# Patient Record
Sex: FEMALE | Race: WHITE | NOT HISPANIC OR LATINO | URBAN - METROPOLITAN AREA
[De-identification: names, ages, dates, MRNs, and addresses within clinical notes are randomized per-mention and may not be internally consistent; named-entity substitution may affect disease eponyms.]

---

## 2024-07-11 ENCOUNTER — OFFICE VISIT (OUTPATIENT)
Dept: URBAN - METROPOLITAN AREA CLINIC 50 | Facility: CLINIC | Age: 89
End: 2024-07-11
Payer: MEDICARE

## 2024-07-11 VITALS
TEMPERATURE: 98.1 F | WEIGHT: 94 LBS | BODY MASS INDEX: 18.46 KG/M2 | HEIGHT: 60 IN | HEART RATE: 68 BPM | SYSTOLIC BLOOD PRESSURE: 146 MMHG | DIASTOLIC BLOOD PRESSURE: 81 MMHG

## 2024-07-11 DIAGNOSIS — R10.84 ABDOMINAL CRAMPING, GENERALIZED: ICD-10-CM

## 2024-07-11 DIAGNOSIS — R19.5 LOOSE STOOLS: ICD-10-CM

## 2024-07-11 DIAGNOSIS — R10.13 ABDOMINAL DISCOMFORT, EPIGASTRIC: ICD-10-CM

## 2024-07-11 PROBLEM — 271864008: Status: ACTIVE | Noted: 2024-07-11

## 2024-07-11 PROBLEM — 43364001: Status: ACTIVE | Noted: 2024-07-11

## 2024-07-11 PROBLEM — 88111009: Status: ACTIVE | Noted: 2024-07-11

## 2024-07-11 PROBLEM — 398032003: Status: ACTIVE | Noted: 2024-07-11

## 2024-07-11 PROBLEM — 162031009: Status: ACTIVE | Noted: 2024-07-11

## 2024-07-11 PROCEDURE — 99204 OFFICE O/P NEW MOD 45 MIN: CPT | Performed by: PHYSICIAN ASSISTANT

## 2024-07-11 PROCEDURE — 99244 OFF/OP CNSLTJ NEW/EST MOD 40: CPT | Performed by: PHYSICIAN ASSISTANT

## 2024-07-11 RX ORDER — PANTOPRAZOLE SODIUM 20 MG/1
1 TABLET TABLET, DELAYED RELEASE ORAL ONCE A DAY
Qty: 30 TABLET | Refills: 0 | OUTPATIENT
Start: 2024-07-11

## 2024-07-11 RX ORDER — DICYCLOMINE HYDROCHLORIDE 10 MG/1
1 CAPSULES CAPSULE ORAL THREE TIMES A DAY
Qty: 15 CAPSULE | Refills: 0 | OUTPATIENT
Start: 2024-07-11 | End: 2024-07-16

## 2024-07-11 NOTE — HPI-TODAY'S VISIT:
Patient is 89 y/o F of Dr. Lona Clement MD here for discussion on abd discomfort Spouse had seen Dr. Abbasi in past for esophageal CA  Was getting occ loose stools since beginning of yr, but now often now in past 3 months  Getting bloating, gassiness, feels like gut is angry Comes and goes, occ mucus/oily in stool Some occ indigestion, but really bowels are problematic In past constipation, but now worse w loose stools No abd pains, occ discomfort/pressure in lower abdomen Wonders if stress/anxiety worsens symptoms - spouse w memory loss/poor mobility and is cargiver for him BMs 1-4x/day depending on how bad symptoms are, feels better after BM No blood/melena  1-2lb weight loss - attributes to avoiding eating as wonders if worsening symptoms Last labs Tuesday reviewed on [facility] portal w CBC, CMP unremarkable Never been tested for celiac - wonders if this is cause

## 2024-07-12 ENCOUNTER — LAB OUTSIDE AN ENCOUNTER (OUTPATIENT)
Dept: URBAN - METROPOLITAN AREA CLINIC 50 | Facility: CLINIC | Age: 89
End: 2024-07-12

## 2024-07-16 LAB
IMMUNOGLOBULIN A: 159
INTERPRETATION: (no result)
TISSUE TRANSGLUTAMINASE AB, IGA: <1

## 2024-07-17 ENCOUNTER — TELEPHONE ENCOUNTER (OUTPATIENT)
Dept: URBAN - METROPOLITAN AREA CLINIC 50 | Facility: CLINIC | Age: 89
End: 2024-07-17

## 2024-07-18 ENCOUNTER — TELEPHONE ENCOUNTER (OUTPATIENT)
Dept: URBAN - METROPOLITAN AREA CLINIC 50 | Facility: CLINIC | Age: 89
End: 2024-07-18

## 2024-07-18 ENCOUNTER — LAB OUTSIDE AN ENCOUNTER (OUTPATIENT)
Dept: URBAN - METROPOLITAN AREA CLINIC 50 | Facility: CLINIC | Age: 89
End: 2024-07-18

## 2024-07-18 LAB
ADENOVIRUS F 40/41: NOT DETECTED
C. DIFFICILE TOXIN A/B, STOOL - QDX: NEGATIVE
CALPROTECTIN, STOOL - QDX: (no result)
CAMPYLOBACTER: NOT DETECTED
CLOSTRIDIUM DIFFICILE: NOT DETECTED
ENTAMOEBA HISTOLYTICA: NOT DETECTED
ENTEROAGGREGATIVE E.COLI: NOT DETECTED
ENTEROTOXIGENIC E.COLI: NOT DETECTED
ESCHERICHIA COLI O157: NOT DETECTED
GIARDIA LAMBLIA: NOT DETECTED
H. PYLORI (EIA) - QDX: NEGATIVE
NOROVIRUS GI/GII: NOT DETECTED
OVA AND PARASITE - QDX: (no result)
PANCREATICELASTASE ELISA, STOOL: (no result)
ROTAVIRUS A: NOT DETECTED
SALMONELLA SPP.: NOT DETECTED
SHIGA-LIKE TOXIN PRODUCING E.COLI: NOT DETECTED
SHIGELLA SPP. / ENTEROINVASIVE E.COLI: NOT DETECTED
VIBRIO PARAHAEMOLYTICUS: NOT DETECTED
VIBRIO SPP.: NOT DETECTED
YERSINIA ENTEROCOLITICA: NOT DETECTED

## 2024-07-18 RX ORDER — PANCRELIPASE 36000; 180000; 114000 [USP'U]/1; [USP'U]/1; [USP'U]/1
2 TABLETS WITH MEALS, AND 1 TABLET WITH SNACKS CAPSULE, DELAYED RELEASE PELLETS ORAL
Qty: 300 | Refills: 11 | OUTPATIENT
Start: 2024-07-18 | End: 2025-07-13

## 2024-07-21 ENCOUNTER — WEB ENCOUNTER (OUTPATIENT)
Dept: URBAN - METROPOLITAN AREA CLINIC 50 | Facility: CLINIC | Age: 89
End: 2024-07-21

## 2024-07-22 ENCOUNTER — TELEPHONE ENCOUNTER (OUTPATIENT)
Dept: URBAN - METROPOLITAN AREA CLINIC 50 | Facility: CLINIC | Age: 89
End: 2024-07-22

## 2024-07-22 ENCOUNTER — WEB ENCOUNTER (OUTPATIENT)
Dept: URBAN - METROPOLITAN AREA CLINIC 50 | Facility: CLINIC | Age: 89
End: 2024-07-22

## 2024-07-25 ENCOUNTER — OFFICE VISIT (OUTPATIENT)
Dept: URBAN - METROPOLITAN AREA CLINIC 50 | Facility: CLINIC | Age: 89
End: 2024-07-25
Payer: MEDICARE

## 2024-07-25 ENCOUNTER — LAB OUTSIDE AN ENCOUNTER (OUTPATIENT)
Dept: URBAN - METROPOLITAN AREA CLINIC 96 | Facility: CLINIC | Age: 89
End: 2024-07-25

## 2024-07-25 VITALS
SYSTOLIC BLOOD PRESSURE: 171 MMHG | TEMPERATURE: 97.7 F | DIASTOLIC BLOOD PRESSURE: 90 MMHG | WEIGHT: 93 LBS | HEART RATE: 64 BPM | BODY MASS INDEX: 18.26 KG/M2 | HEIGHT: 60 IN

## 2024-07-25 DIAGNOSIS — R19.5 LOOSE STOOLS: ICD-10-CM

## 2024-07-25 DIAGNOSIS — K30 INDIGESTION: ICD-10-CM

## 2024-07-25 DIAGNOSIS — K86.81 EXOCRINE PANCREATIC INSUFFICIENCY: ICD-10-CM

## 2024-07-25 PROBLEM — 48694002: Status: ACTIVE | Noted: 2024-07-25

## 2024-07-25 PROCEDURE — 99213 OFFICE O/P EST LOW 20 MIN: CPT | Performed by: PHYSICIAN ASSISTANT

## 2024-07-25 RX ORDER — PANTOPRAZOLE SODIUM 20 MG/1
1 TABLET TABLET, DELAYED RELEASE ORAL ONCE A DAY
Qty: 30 TABLET | Refills: 0 | Status: ACTIVE | COMMUNITY
Start: 2024-07-11

## 2024-07-25 RX ORDER — PANCRELIPASE 36000; 180000; 114000 [USP'U]/1; [USP'U]/1; [USP'U]/1
2 TABLETS WITH MEALS, AND 1 TABLET WITH SNACKS CAPSULE, DELAYED RELEASE PELLETS ORAL
Qty: 300 | Refills: 11 | Status: ACTIVE | COMMUNITY
Start: 2024-07-18 | End: 2025-07-13

## 2024-07-25 NOTE — HPI-TODAY'S VISIT:
7/25/24: [age] y/o F here for f/u EPI Seen w son, Marco A Admits is eaten up w anxiety around EPI Worried on diet, has done a lot of online reading, read needs to eat 6 times a day Do I need to do so many snacks/meals? Feels has to eat more when looked online and read about it Now on creon, feels has helped a lot, bowels are responding really well Stools seem normal within 1 day, now much less watery/oily, better BMs 1x/day Appetite good, no weight loss, no nausea/vomiting Planning on doing CT later this afternoon -- 7/11/24: Patient is 87 y/o F of Dr. Lona Clement MD here for discussion on abd discomfort Spouse had seen Dr. Abbasi in past for esophageal CA  Was getting occ loose stools since beginning of yr, but now often now in past 3 months  Getting bloating, gassiness, feels like gut is angry Comes and goes, occ mucus/oily in stool Some occ indigestion, but really bowels are problematic In past constipation, but now worse w loose stools No abd pains, occ discomfort/pressure in lower abdomen Wonders if stress/anxiety worsens symptoms - spouse w memory loss/poor mobility and is cargiver for him BMs 1-4x/day depending on how bad symptoms are, feels better after BM No blood/melena  1-2lb weight loss - attributes to avoiding eating as wonders if worsening symptoms Last labs Tuesday reviewed on [facility] portal w CBC, CMP unremarkable Never been tested for celiac - wonders if this is cause

## 2024-07-26 ENCOUNTER — DASHBOARD ENCOUNTERS (OUTPATIENT)
Age: 89
End: 2024-07-26

## 2024-07-26 ENCOUNTER — TELEPHONE ENCOUNTER (OUTPATIENT)
Dept: URBAN - METROPOLITAN AREA CLINIC 50 | Facility: CLINIC | Age: 89
End: 2024-07-26

## 2024-08-06 ENCOUNTER — OFFICE VISIT (OUTPATIENT)
Dept: URBAN - METROPOLITAN AREA CLINIC 50 | Facility: CLINIC | Age: 89
End: 2024-08-06

## 2024-08-08 ENCOUNTER — ERX REFILL RESPONSE (OUTPATIENT)
Dept: URBAN - METROPOLITAN AREA CLINIC 50 | Facility: CLINIC | Age: 89
End: 2024-08-08

## 2024-08-08 RX ORDER — PANTOPRAZOLE SODIUM 20 MG/1
1 TABLET TABLET, DELAYED RELEASE ORAL ONCE A DAY
Qty: 30 TABLET | Refills: 0 | OUTPATIENT

## 2024-08-08 RX ORDER — PANTOPRAZOLE SODIUM 20 MG/1
1 TABLET TABLET, DELAYED RELEASE ORAL ONCE A DAY
Qty: 90 TABLET | Refills: 3 | OUTPATIENT

## 2024-08-14 ENCOUNTER — WEB ENCOUNTER (OUTPATIENT)
Dept: URBAN - METROPOLITAN AREA CLINIC 50 | Facility: CLINIC | Age: 89
End: 2024-08-14

## 2024-08-14 RX ORDER — PANCRELIPASE LIPASE, PANCRELIPASE AMYLASE, AND PANCRELIPASE PROTEASE 149900; 37000; 97300 [USP'U]/1; [USP'U]/1; [USP'U]/1
AS DIRECTED CAPSULE, DELAYED RELEASE ORAL
Qty: 300 | Refills: 11 | OUTPATIENT
Start: 2024-08-15 | End: 2025-08-10

## 2024-08-15 ENCOUNTER — WEB ENCOUNTER (OUTPATIENT)
Dept: URBAN - METROPOLITAN AREA CLINIC 50 | Facility: CLINIC | Age: 89
End: 2024-08-15

## 2024-09-13 ENCOUNTER — WEB ENCOUNTER (OUTPATIENT)
Dept: URBAN - METROPOLITAN AREA CLINIC 50 | Facility: CLINIC | Age: 89
End: 2024-09-13

## 2024-09-26 ENCOUNTER — OFFICE VISIT (OUTPATIENT)
Dept: URBAN - METROPOLITAN AREA CLINIC 50 | Facility: CLINIC | Age: 89
End: 2024-09-26
Payer: MEDICARE

## 2024-09-26 VITALS
SYSTOLIC BLOOD PRESSURE: 165 MMHG | HEIGHT: 60 IN | TEMPERATURE: 98 F | WEIGHT: 93 LBS | BODY MASS INDEX: 18.26 KG/M2 | HEART RATE: 65 BPM | DIASTOLIC BLOOD PRESSURE: 82 MMHG

## 2024-09-26 DIAGNOSIS — K86.81 EXOCRINE PANCREATIC INSUFFICIENCY: ICD-10-CM

## 2024-09-26 DIAGNOSIS — R63.6 UNDERWEIGHT: ICD-10-CM

## 2024-09-26 DIAGNOSIS — K86.89 ATROPHIC PANCREAS: ICD-10-CM

## 2024-09-26 PROCEDURE — 99213 OFFICE O/P EST LOW 20 MIN: CPT | Performed by: INTERNAL MEDICINE

## 2024-09-26 NOTE — HPI-TODAY'S VISIT:
89 yo WF  Seen by HERNANDEZ Rutherford in past Dx w/ EPI - wants to talk to me to get questions answered Dr Douglas can't answer  Feels good Bowels every day - once or max twice - every few days have had loose stool that is diarrhea No mucus No pain  Not sure what to eat Never really drank more than socially  Wt down but now stable

## 2025-02-27 ENCOUNTER — OFFICE VISIT (OUTPATIENT)
Dept: URBAN - METROPOLITAN AREA CLINIC 50 | Facility: CLINIC | Age: 89
End: 2025-02-27

## 2025-02-27 ENCOUNTER — LAB OUTSIDE AN ENCOUNTER (OUTPATIENT)
Dept: URBAN - METROPOLITAN AREA CLINIC 50 | Facility: CLINIC | Age: 89
End: 2025-02-27

## 2025-02-27 VITALS
TEMPERATURE: 97.9 F | HEIGHT: 60 IN | DIASTOLIC BLOOD PRESSURE: 81 MMHG | RESPIRATION RATE: 18 BRPM | WEIGHT: 90 LBS | SYSTOLIC BLOOD PRESSURE: 118 MMHG | HEART RATE: 71 BPM | BODY MASS INDEX: 17.67 KG/M2

## 2025-02-27 PROBLEM — 73595000: Status: ACTIVE | Noted: 2025-02-27

## 2025-02-27 NOTE — HPI-TODAY'S VISIT:
2/27/25: 89 y/o F here concern on abnormal wt loss Last labs per [facility] portal 1/9/25 reviewed w CBC, CMP wnl Concerned since EPI dx wt has progressively been less Throughout life, 105 lbs When dx, 89 Now worried down to 90lbs Discussed w PCP - states labs good, and up to date on everything, so PCP not worried However patient still worried No nausea/vomiting Life stressful - spouse had femur fracture, then moved spouse to [place] in Nov/Dec Is it stress causing this? Never lets up - he's always having problems - tearful during interview Feels eating well, aware of diet/nutrition but still worried about weight Is creon doing this? Feels is in a dark place about it No abd pains, no nausea/vomiting Bowels unchanged, no blood/mucus/melena, going daily, appetite good, eating well, no indigestion/heartburn  -- 9/26/24: 87 yo WF  Seen by HERNANDEZ Rutherford in past Dx w/ EPI - wants to talk to me to get questions answered Dr Douglas can't answer  Feels good Bowels every day - once or max twice - every few days have had loose stool that is diarrhea No mucus No pain  Not sure what to eat Never really drank more than socially  Wt down but now stable

## 2025-05-06 ENCOUNTER — WEB ENCOUNTER (OUTPATIENT)
Dept: URBAN - METROPOLITAN AREA CLINIC 50 | Facility: CLINIC | Age: 89
End: 2025-05-06

## 2025-05-08 ENCOUNTER — LAB OUTSIDE AN ENCOUNTER (OUTPATIENT)
Dept: URBAN - METROPOLITAN AREA CLINIC 50 | Facility: CLINIC | Age: 89
End: 2025-05-08

## 2025-05-16 ENCOUNTER — LAB OUTSIDE AN ENCOUNTER (OUTPATIENT)
Dept: URBAN - METROPOLITAN AREA CLINIC 96 | Facility: CLINIC | Age: 89
End: 2025-05-16

## 2025-05-16 LAB
CREATININE POC: 1
PERFORMING LAB: (no result)

## 2025-05-19 ENCOUNTER — WEB ENCOUNTER (OUTPATIENT)
Dept: URBAN - METROPOLITAN AREA CLINIC 50 | Facility: CLINIC | Age: 89
End: 2025-05-19

## 2025-05-19 ENCOUNTER — TELEPHONE ENCOUNTER (OUTPATIENT)
Dept: URBAN - METROPOLITAN AREA CLINIC 50 | Facility: CLINIC | Age: 89
End: 2025-05-19

## 2025-07-24 ENCOUNTER — WEB ENCOUNTER (OUTPATIENT)
Dept: URBAN - METROPOLITAN AREA CLINIC 96 | Facility: CLINIC | Age: 89
End: 2025-07-24

## 2025-07-24 ENCOUNTER — OFFICE VISIT (OUTPATIENT)
Dept: URBAN - METROPOLITAN AREA CLINIC 50 | Facility: CLINIC | Age: 89
End: 2025-07-24
Payer: MEDICARE

## 2025-07-24 DIAGNOSIS — F43.9 STRESS: ICD-10-CM

## 2025-07-24 DIAGNOSIS — R54 ADVANCED AGE: ICD-10-CM

## 2025-07-24 DIAGNOSIS — R63.6 UNDERWEIGHT: ICD-10-CM

## 2025-07-24 DIAGNOSIS — K86.81 EXOCRINE PANCREATIC INSUFFICIENCY: ICD-10-CM

## 2025-07-24 DIAGNOSIS — R19.8 IRREGULAR BOWEL HABITS: ICD-10-CM

## 2025-07-24 DIAGNOSIS — K86.89 ATROPHIC PANCREAS: ICD-10-CM

## 2025-07-24 DIAGNOSIS — R15.2 DEFECATION URGENCY: ICD-10-CM

## 2025-07-24 PROCEDURE — 99214 OFFICE O/P EST MOD 30 MIN: CPT | Performed by: INTERNAL MEDICINE

## 2025-07-24 NOTE — HPI-TODAY'S VISIT:
89 yo WF Seen by HERNANDEZ Rutherford in February  Trying to deal with horrible disease and not coping well Not big, but weight loss worries her Have lost 10 pounds - how do I put weight on - when diet prohibits her Takes Creon - doesn't miss dose Eats what she wants Good appetite Sometimes normal bowels, other times diarrhea or soft 2 episodes where almost lost control while in car Labs in July at [facility] - everything good Lots of stress -- had to put  in memory care -- life not the same

## 2025-07-28 ENCOUNTER — LAB OUTSIDE AN ENCOUNTER (OUTPATIENT)
Dept: URBAN - METROPOLITAN AREA CLINIC 96 | Facility: CLINIC | Age: 89
End: 2025-07-28

## 2025-07-30 LAB — FECAL FAT, QUALITATIVE: (no result)

## 2025-08-04 ENCOUNTER — WEB ENCOUNTER (OUTPATIENT)
Dept: URBAN - METROPOLITAN AREA CLINIC 50 | Facility: CLINIC | Age: 89
End: 2025-08-04